# Patient Record
Sex: FEMALE | Race: WHITE
[De-identification: names, ages, dates, MRNs, and addresses within clinical notes are randomized per-mention and may not be internally consistent; named-entity substitution may affect disease eponyms.]

---

## 2017-03-03 ENCOUNTER — HOSPITAL ENCOUNTER (OUTPATIENT)
Dept: HOSPITAL 53 - M LAB REF | Age: 37
End: 2017-03-03
Attending: NURSE PRACTITIONER
Payer: COMMERCIAL

## 2017-03-03 DIAGNOSIS — R05: Primary | ICD-10-CM

## 2018-02-19 ENCOUNTER — HOSPITAL ENCOUNTER (OUTPATIENT)
Dept: HOSPITAL 53 - M LAB REF | Age: 38
End: 2018-02-19
Attending: NURSE PRACTITIONER
Payer: COMMERCIAL

## 2018-02-19 DIAGNOSIS — D72.829: Primary | ICD-10-CM

## 2018-02-20 LAB
REASON FOR REVIEW: (no result)
SOURCE: (no result)

## 2019-01-22 ENCOUNTER — HOSPITAL ENCOUNTER (OUTPATIENT)
Dept: HOSPITAL 53 - M LAB REF | Age: 39
End: 2019-01-22
Attending: NURSE PRACTITIONER
Payer: COMMERCIAL

## 2019-01-22 DIAGNOSIS — E78.5: Primary | ICD-10-CM

## 2019-01-22 DIAGNOSIS — E78.00: ICD-10-CM

## 2019-01-23 LAB — LDLC SERPL DIRECT ASSAY-MCNC: 127 MG/DL (ref 0–99)

## 2019-03-01 ENCOUNTER — HOSPITAL ENCOUNTER (OUTPATIENT)
Dept: HOSPITAL 53 - M LAB REF | Age: 39
End: 2019-03-01
Attending: NURSE PRACTITIONER
Payer: COMMERCIAL

## 2019-03-01 DIAGNOSIS — L02.214: Primary | ICD-10-CM

## 2020-09-04 ENCOUNTER — HOSPITAL ENCOUNTER (OUTPATIENT)
Dept: HOSPITAL 53 - M WUC | Age: 40
End: 2020-09-04
Attending: PHYSICIAN ASSISTANT
Payer: COMMERCIAL

## 2020-09-04 DIAGNOSIS — R06.2: ICD-10-CM

## 2020-09-04 DIAGNOSIS — F17.210: ICD-10-CM

## 2020-09-04 DIAGNOSIS — J20.9: Primary | ICD-10-CM

## 2020-09-30 NOTE — REP
CHEST X-RAY 



CLINICAL: Bronchitis and wheezing. 



TECHNIQUE: PA and lateral.  



COMPARISON: 09/07/2017. 



FINDINGS: 

Mediastinum and cardiac silhouette are normal. Lung fields are clear. No focal 
consolidation, effusion, or pneumothorax. Skeletal structures are intact.   



IMPRESSION: 

No focal consolidation. If the patient remains symptomatic, consider chest CT 
for further investigation given the patients high risk factors.   

MTDD

## 2021-12-14 ENCOUNTER — HOSPITAL ENCOUNTER (OUTPATIENT)
Dept: HOSPITAL 53 - M LAB REF | Age: 41
End: 2021-12-14
Attending: INTERNAL MEDICINE
Payer: COMMERCIAL

## 2021-12-14 DIAGNOSIS — E78.5: Primary | ICD-10-CM

## 2021-12-16 LAB — LDLC SERPL DIRECT ASSAY-MCNC: 166 MG/DL (ref 0–99)

## 2022-10-09 ENCOUNTER — HOSPITAL ENCOUNTER (OUTPATIENT)
Dept: HOSPITAL 53 - M WUC | Age: 42
End: 2022-10-09
Attending: PHYSICIAN ASSISTANT
Payer: COMMERCIAL

## 2022-10-09 DIAGNOSIS — L03.031: Primary | ICD-10-CM

## 2025-02-13 ENCOUNTER — HOSPITAL ENCOUNTER (OUTPATIENT)
Dept: HOSPITAL 53 - M WUC | Age: 45
End: 2025-02-13
Attending: INTERNAL MEDICINE
Payer: COMMERCIAL

## 2025-02-13 DIAGNOSIS — M54.50: Primary | ICD-10-CM

## 2025-03-03 ENCOUNTER — HOSPITAL ENCOUNTER (OUTPATIENT)
Dept: HOSPITAL 53 - M RAD | Age: 45
End: 2025-03-03
Attending: INTERNAL MEDICINE
Payer: COMMERCIAL

## 2025-03-03 DIAGNOSIS — E04.1: Primary | ICD-10-CM

## 2025-04-03 ENCOUNTER — HOSPITAL ENCOUNTER (OUTPATIENT)
Dept: HOSPITAL 53 - M OPP | Age: 45
Discharge: HOME | End: 2025-04-03
Attending: SURGERY
Payer: COMMERCIAL

## 2025-04-03 VITALS — OXYGEN SATURATION: 96 % | SYSTOLIC BLOOD PRESSURE: 118 MMHG | DIASTOLIC BLOOD PRESSURE: 80 MMHG

## 2025-04-03 VITALS — WEIGHT: 214 LBS | HEIGHT: 65 IN | BODY MASS INDEX: 35.65 KG/M2

## 2025-04-03 VITALS — TEMPERATURE: 97.3 F

## 2025-04-03 DIAGNOSIS — R10.13: ICD-10-CM

## 2025-04-03 DIAGNOSIS — Z79.84: ICD-10-CM

## 2025-04-03 DIAGNOSIS — K31.89: ICD-10-CM

## 2025-04-03 DIAGNOSIS — Z79.4: ICD-10-CM

## 2025-04-03 DIAGNOSIS — K44.9: ICD-10-CM

## 2025-04-03 DIAGNOSIS — Z91.018: ICD-10-CM

## 2025-04-03 DIAGNOSIS — Z91.013: ICD-10-CM

## 2025-04-03 DIAGNOSIS — Z91.041: ICD-10-CM

## 2025-04-03 DIAGNOSIS — Z79.899: ICD-10-CM

## 2025-04-03 DIAGNOSIS — K21.00: Primary | ICD-10-CM

## 2025-04-03 DIAGNOSIS — Z87.891: ICD-10-CM

## 2025-04-03 DIAGNOSIS — J45.909: ICD-10-CM

## 2025-04-03 DIAGNOSIS — Z91.040: ICD-10-CM

## 2025-04-03 PROCEDURE — 43239 EGD BIOPSY SINGLE/MULTIPLE: CPT

## 2025-04-03 PROCEDURE — 88305 TISSUE EXAM BY PATHOLOGIST: CPT
